# Patient Record
Sex: FEMALE | Race: WHITE
[De-identification: names, ages, dates, MRNs, and addresses within clinical notes are randomized per-mention and may not be internally consistent; named-entity substitution may affect disease eponyms.]

---
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
## 2020-08-20 ENCOUNTER — HOSPITAL ENCOUNTER (EMERGENCY)
Dept: HOSPITAL 75 - ER | Age: 20
Discharge: HOME | End: 2020-08-20
Payer: COMMERCIAL

## 2020-08-20 VITALS — SYSTOLIC BLOOD PRESSURE: 119 MMHG | DIASTOLIC BLOOD PRESSURE: 63 MMHG

## 2020-08-20 VITALS — BODY MASS INDEX: 24.57 KG/M2 | HEIGHT: 67.8 IN | WEIGHT: 160.28 LBS

## 2020-08-20 DIAGNOSIS — N83.201: ICD-10-CM

## 2020-08-20 DIAGNOSIS — N76.0: Primary | ICD-10-CM

## 2020-08-20 LAB
APTT PPP: YELLOW S
BACTERIA #/AREA URNS HPF: (no result) /HPF
BILIRUB UR QL STRIP: NEGATIVE
FIBRINOGEN PPP-MCNC: CLEAR MG/DL
GLUCOSE UR STRIP-MCNC: NEGATIVE MG/DL
KETONES UR QL STRIP: NEGATIVE
LEUKOCYTE ESTERASE UR QL STRIP: (no result)
NITRITE UR QL STRIP: NEGATIVE
PH UR STRIP: 6 [PH] (ref 5–9)
PROT UR QL STRIP: NEGATIVE
RBC #/AREA URNS HPF: (no result) /HPF
SP GR UR STRIP: 1.02 (ref 1.02–1.02)
SQUAMOUS #/AREA URNS HPF: (no result) /HPF
WBC #/AREA URNS HPF: (no result) /HPF

## 2020-08-20 PROCEDURE — 76856 US EXAM PELVIC COMPLETE: CPT

## 2020-08-20 PROCEDURE — 84703 CHORIONIC GONADOTROPIN ASSAY: CPT

## 2020-08-20 PROCEDURE — 76830 TRANSVAGINAL US NON-OB: CPT

## 2020-08-20 PROCEDURE — 87491 CHLMYD TRACH DNA AMP PROBE: CPT

## 2020-08-20 PROCEDURE — 81000 URINALYSIS NONAUTO W/SCOPE: CPT

## 2020-08-20 PROCEDURE — 36415 COLL VENOUS BLD VENIPUNCTURE: CPT

## 2020-08-20 PROCEDURE — 87210 SMEAR WET MOUNT SALINE/INK: CPT

## 2020-08-20 PROCEDURE — 87591 N.GONORRHOEAE DNA AMP PROB: CPT

## 2020-08-20 RX ADMIN — IBUPROFEN ONE MG: 800 TABLET, FILM COATED ORAL at 12:03

## 2020-08-20 NOTE — DIAGNOSTIC IMAGING REPORT
PROCEDURE: US Non-ob pelvis comp/trans.



TECHNIQUE:  Multiple realtime grayscale images were obtained of

the pelvis in various projections endovaginally.



Transabdominal imaging was also performed.



INDICATION:  Pelvic pain.



Uterus is anteverted measuring 8.2 x 4.9 x 5.9 cm. Endometrium is

3 mm in thickness. There is an IUD which appears to be

appropriately centered in the endometrial canal. No myometrial

mass is detected. Left ovary was not visualized. The right ovary

measures 4.8 x 3.4 x 3.6 cm. There is a partially collapsed cyst

in the right ovary approximately 3.1 x 1.1 cm. Moderate free

fluid is present.



IMPRESSION:

1. The IUD is appropriately centered in the endometrial canal.

2. Partially collapsed right ovarian cyst measuring 3.1 x 1.1 cm.

Moderate free fluid is present. Left ovary was not visualized.



Dictated by: 



  Dictated on workstation # [id number]

## 2020-08-20 NOTE — NUR
AMB TO ROOM TO TRY AND OBTAIN UA. 

-------------------------------------------------------------------------------

Addendum: 08/20/20 at 1115 by PMCCLURE

-------------------------------------------------------------------------------

PATIENT WILL UPDATE  PARENT.

## 2020-08-20 NOTE — ED GU-FEMALE
General


Chief Complaint:  General Problems/Pain


Stated Complaint:  ABDOMINAL PAIN;PELVIC PAIN


Nursing Triage Note:  


TO ED PER W/C REPORTS HAVING LOW ABD PAIN HAS BEEN HAVING PROBLEMS SINCE IUD 


PLACED 7 MONTHS AGO AFTER HAD VAG  DELIVERY  SAW  FAMILY DR AT [facility] REPORTS THAT IUD  WAS IN PLACE AT THAT [facility]. TODAY WOKE UP HAD SEVERE 


PAIN AFTER URINATING. HAS APPOINTMENT WITH HER DR ON SEPT 3.


Nursing Sepsis Screen:  No Definite Risk


Source:  patient


Exam Limitations:  no limitations


 (ASTON RIZZO)





History of Present Illness


Date Seen by Provider:  Aug 20, 2020


Time Seen by Provider:  10:13


Initial Comments


This is a 19 y/o F who presents to the ED via private vehicle w/ complaints of 

bilateral lower abd pain "where my ovaries are". States she has been having 

similar cramping pain (with episodes of sharp pain) intermittently since getting

an IUD (Mirena) in March. She had a vaginal delivery Seven months ago. Reports 

he pain comes and goes randomly but is worse during sex and is unrelated to her 

menstrual period. She has tried Ibuprofen for pain during this time (but not 

today) with some relief. Denies any associating sx including F, chills, N/V/D, 

dysuria, burning with urination, hematuria, frequency, change in vaginal 

discharge or abnormal odor. Pt has hx of Chlamydia infection last year in July 

for which she and her partner were treated. Pt has been with the same partner 

since then but reports neither of them have been retested since then. Her last 

sexual encounter was 2 days ago. States her menstrual periods have been 5wks 

apart since getting the IUD. Denies any medical or surgical hx. No other 

complaints at this time.


Timing/Duration:  intermittent


Severity/Quality:  moderate, cramping


Location:  other (bilateral Lower abdomen/pelvic)


Radiation:  none


Activities at Onset:  none


Sexual Meyers History:  single partner, other (last active 2 days ago)


Modifying Factors:  Improves With Analgesics; Worsens With Palpation; Improves 

With Other (worse during intercourse)


Associated Symptoms:  No diaphoresis, No dysuria, No fever/chills, No loss of 

bladder control, No nausea/vomiting, No nocturia, No polyuria, No urinary 

frequency; other (no abnormal vaginal d/c or foul smell) (ASTON RIZZO)





Allergies and Home Medications


Allergies


Coded Allergies:  


     No Known Drug Allergies (Unverified , 10/29/19)





Home Medications


Hydrocodone/Acetaminophen 1 Each Tablet, 1 EACH PO Q6H


   Prescribed by: JAYA MOSELEY on 20 1331


Metronidazole 500 Mg Tablet, 500 MG PO BID


   Prescribed by: JAYA MOSELEY on 20 1331


Prenatal Vit No.124/Iron/FA 1 Each Tablet, 1 EACH PO DAILY, (Reported)





Patient Home Medication List


Home Medication List Reviewed:  Yes


 (JAYA MOSELEY)





Review of Systems


Review of Systems


Constitutional:  no symptoms reported


Respiratory:  no symptoms reported


Cardiovascular:  no symptoms reported


Genitourinary:  denies burning (bilat pelvic/lower abd pain), denies discharge, 

denies dysuria, denies frequency, denies flank pain, denies hematuria; pain; 

denies urgency


Musculoskeletal:  no symptoms reported


Skin:  no symptoms reported (ASTON RIZZO)





Past Medical-Social-Family Hx


Patient Social History


Alcohol Use:  Occasionally Uses


Recreational Drug Use:  No


Smoking Status:  Never a Smoker


2nd Hand Smoke Exposure:  No


Recent Foreign Travel:  No


Contact w/Someone Who Travel:  No


Recent Infectious Disease Expo:  No


Recent Hopitalizations:  No


 (ASTON RIZZO MED EDI)





Seasonal Allergies


Seasonal Allergies:  No


 (ASTON RIZZO [employer])





Past Medical History


Surgeries:  Yes (wisdom teeth)


Respiratory:  No


Cardiac:  No


Neurological:  No


Genitourinary:  No


Gastrointestinal:  No


Musculoskeletal:  No


Endocrine:  No


HEENT:  No


Cancer:  No


Psychosocial:  No


Integumentary:  No


Blood Disorders:  No


 (ASTON RIZZO MED EDI)





Physical Exam


Vital Signs





Vital Signs - First Documented








 20





 10:09


 


Temp 35.1


 


Pulse 88


 


Resp 18


 


B/P (MAP) 121/89 (100)


 


Pulse Ox 98


 


O2 Delivery Room Air





 (JAYA MOSELEY)


Vital Signs


Capillary Refill : Less Than 3 Seconds 


 (ASTON RIZZO)


Height, Weight, BMI


Height: 5'9.00"


Weight: 157lbs. oz. 71.754733ta; 24.00 BMI


Method:Stated


General Appearance:  WD/WN, no apparent distress


HEENT:  PERRL/EOMI, normal ENT inspection


Cardiovascular:  regular rate, rhythm, no edema, no gallop, no JVD, no murmur


Respiratory:  chest non-tender, lungs clear, normal breath sounds, no 

respiratory distress


Gastrointestinal:  normal bowel sounds, soft, no organomegaly, no pulsatile 

mass; No distended, No guarding, No rebound; tenderness (to palpation of bilat 

lower abd )


Back:  normal inspection, no CVA tenderness, no vertebral tenderness


Neurologic/Psychiatric:  alert, oriented x 3


Skin:  normal color, warm/dry ([facility])





Progress/Results/Core Measures


Suspected Sepsis


Recent Fever Within 48 Hours:  No


Infection Criteria Present:  None


New/Unexplained  Altered Menta:  No


Sepsis Screen:  No Definite Risk


SIRS


Temperature: 


Pulse: 88 


Respiratory Rate: 18


 


Blood Pressure 121 /89 


Mean: 100


 


 ([facility])





Results/Orders


Lab Results





Laboratory Tests








Test


 20


11:21 20


12:51 Range/Units


 


 


Urine Color YELLOW    


 


Urine Clarity CLEAR    


 


Urine pH 6.0   5-9  


 


Urine Specific Gravity 1.020   1.016-1.022  


 


Urine Protein NEGATIVE   NEGATIVE  


 


Urine Glucose (UA) NEGATIVE   NEGATIVE  


 


Urine Ketones NEGATIVE   NEGATIVE  


 


Urine Nitrite NEGATIVE   NEGATIVE  


 


Urine Bilirubin NEGATIVE   NEGATIVE  


 


Urine Urobilinogen 0.2   < = 1.0  MG/DL


 


Urine Leukocyte Esterase TRACE H  NEGATIVE  


 


Urine RBC (Auto) NEGATIVE   NEGATIVE  


 


Urine RBC NONE    /HPF


 


Urine WBC 0-2    /HPF


 


Urine Squamous Epithelial


Cells 2-5 


 


  /HPF





 


Urine Crystals NONE    /LPF


 


Urine Bacteria FEW H   /HPF


 


Urine Casts NONE    /LPF


 


Urine Mucus SMALL H   /LPF


 


Urine Culture Indicated NO    





 (JAYA MOSELEY)


Micro Results





Microbiology


20 Wet Prep - Final, Complete


          


 (JAYA MOSELEY)


My Orders





Orders - JAYA MOSELEY


Ibuprofen  Tablet (Motrin  Tablet) (20 11:45)


 (JAYA MOSELEY)


Medications Given in ED





Current Medications








 Medications  Dose


 Ordered  Sig/Sherron


 Route  Start Time


 Stop Time Status Last Admin


Dose Admin


 


 Ibuprofen  800 mg  ONCE  ONCE


 PO  20 11:45


 20 11:46 DC 20 12:03


800 MG





 (JAYA MOSELEY)


Vital Signs/I&O











 20





 10:09 13:44


 


Temp 35.1 


 


Pulse 88 87


 


Resp 18 18


 


B/P (MAP) 121/89 (100) 119/63


 


Pulse Ox 98 98


 


O2 Delivery Room Air Room Air





 (JAYA MOSELEY)


Vital Signs/I&O


Capillary Refill : Less Than 3 Seconds 


 (ASTON RIZZO RAMA DALEY)








Blood Pressure Mean:                    100








Progress Note :  


   Time:  13:35


Progress Note


I have seen and evaluated the patient alongside med student and agree with above

as indicated. I've informed the patient of her laboratory and imaging studies. 

We will start her on pain medication and Flagyl for ruptured ovarian cyst and 

bacterial vaginosis. She agrees with plan of care, plans for discharge, close 

follow-up was instructed.


 (JAYA MOSELEY)





ECG


Initial ECG Impression Date:  Aug 20, 2020


 (JAYA MOSELEY)


Diagnostic Imaging





   Diagonstic Imaging:  Ultrasound


   Plain Films/CT/US/NM/MRI:  pelvis


Comments


                 [facility] [place], [place]





NAME:   NEGRITO ANGUIANO


MED REC#:   [id number]


ACCOUNT#:   [id number]


PT STATUS:   REG ER


:   2000


PHYSICIAN:   JASMINE PINEDA MD


ADMIT DATE:   20/ER


                                   ***Draft***


Date of Exam:20





US NON OB PELVIS COMP/TRANSVAG








PROCEDURE: US Non-ob pelvis comp/trans.





TECHNIQUE:  Multiple realtime grayscale images were obtained of


the pelvis in various projections endovaginally.





Transabdominal imaging was also performed.





INDICATION:  Pelvic pain.





Uterus is anteverted measuring 8.2 x 4.9 x 5.9 cm. Endometrium is


3 mm in thickness. There is an IUD which appears to be


appropriately centered in the endometrial canal. No myometrial


mass is detected. Left ovary was not visualized. The right ovary


measures 4.8 x 3.4 x 3.6 cm. There is a partially collapsed cyst


in the right ovary approximately 3.1 x 1.1 cm. Moderate free


fluid is present.





IMPRESSION:


1. The IUD is appropriately centered in the endometrial canal.


2. Partially collapsed right ovarian cyst measuring 3.1 x 1.1 cm.


Moderate free fluid is present. Left ovary was not visualized.





  Dictated on workstation # [id number]








Dict:   20 1234


Trans:   20 1241


CVB [id number]





Interpreted by:     ANTONINA OVALLE MD


Electronically signed by:  


 (ASTON RIZZO [facility])





Departure


Impression





   Primary Impression:  


   Bacterial vaginosis


   Additional Impression:  


   Ruptured ovarian cyst


Disposition:   HOME, SELF-CARE


Condition:  Stable/Unchanged





Departure-Patient Inst.


Decision time for Depature:  13:29


 (JAYA MOSELEY)


Referrals:  


[facility] (PCP/Family)


Primary Care Physician


Patient Instructions:  Bacterial Vaginosis, Ovarian Cyst (DC)





Add. Discharge Instructions:  


Take medication as directed. Follow-up with your OB/GYN within 1 week for 

recheck. Return back to the emergency room for worsening symptoms or concerns as

needed.





All discharge instructions reviewed with patient and/or family. Voiced 

understanding.


Scripts


Hydrocodone/Acetaminophen (Hydrocodone-Acetamin 5-325 mg) 1 Each Tablet


1 EACH PO Q6H for 3 Days, #15 TAB


   Prov: JAYA MOSELEY         20 


Metronidazole (Flagyl) 500 Mg Tablet


500 MG PO BID for 7 Days, #14 TAB


   Prov: JAYA MOSELEY         20











ASTON RIZZO [facility]  Aug 20, 2020 11:11


JAYA MOSELEY                  Aug 20, 2020 13:31

## 2021-04-12 ENCOUNTER — HOSPITAL ENCOUNTER (EMERGENCY)
Dept: HOSPITAL 75 - ER | Age: 21
Discharge: LEFT BEFORE DISCHARGE | End: 2021-04-12
Payer: MEDICAID

## 2021-04-12 DIAGNOSIS — R42: ICD-10-CM

## 2021-04-12 DIAGNOSIS — R51.9: Primary | ICD-10-CM

## 2022-01-27 ENCOUNTER — HOSPITAL ENCOUNTER (OUTPATIENT)
Dept: HOSPITAL 75 - RAD | Age: 22
End: 2022-01-27
Attending: OTOLARYNGOLOGY
Payer: MEDICAID

## 2022-01-27 DIAGNOSIS — R51.9: ICD-10-CM

## 2022-01-27 DIAGNOSIS — H93.19: Primary | ICD-10-CM

## 2022-01-27 PROCEDURE — 70553 MRI BRAIN STEM W/O & W/DYE: CPT

## 2022-01-27 NOTE — DIAGNOSTIC IMAGING REPORT
PROCEDURE: MR imaging of the brain with and without contrast.



TECHNIQUE: Multiplanar, multisequence MR imaging of the brain was

performed with and without contrast.



INDICATION: Headaches and tinnitus.



COMPARISON: No prior studies are available for comparison.



FINDINGS: Ventricles and sulci are within normal limits. No

sulcal effacement or midline shift is identified. No acute

intra-axial or extra-axial hemorrhage is detected. There is no

diffusion restriction. The normal expected flow-voids within the

carotid siphons are seen. No white matter lesions are seen. The

corpus callosum is unremarkable. The sella and parasellar

structures are unremarkable. There is no abnormal enhancement

following contrast administration.



IMPRESSION: Unremarkable pre- and post-contrast MRI of the brain.



Dictated by: 



  Dictated on workstation # [id number]

## 2022-08-07 ENCOUNTER — HOSPITAL ENCOUNTER (EMERGENCY)
Dept: HOSPITAL 75 - ER | Age: 22
Discharge: LEFT BEFORE DISCHARGE | End: 2022-08-07
Payer: MEDICAID

## 2022-08-07 DIAGNOSIS — R10.2: Primary | ICD-10-CM
